# Patient Record
Sex: MALE | Race: WHITE | NOT HISPANIC OR LATINO | Employment: STUDENT | URBAN - METROPOLITAN AREA
[De-identification: names, ages, dates, MRNs, and addresses within clinical notes are randomized per-mention and may not be internally consistent; named-entity substitution may affect disease eponyms.]

---
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
## 2020-01-22 ENCOUNTER — TELEPHONE (OUTPATIENT)
Dept: UROLOGY | Facility: AMBULATORY SURGERY CENTER | Age: 20
End: 2020-01-22

## 2020-01-22 NOTE — TELEPHONE ENCOUNTER
Reason for appointment/Complaint/Diagnosis :testicular pain requesting to see Dr Chen Medina: So Hernández  History of Cancer? no  If yes, what kind?no  Previous urologist? no    Records requested/where?no  Outside testing/where?no  Location Preference for office visit? [facility]

## 2020-01-22 NOTE — TELEPHONE ENCOUNTER
Called and spoke with patient at this time  He reports he has been having left testicular discomfort for over a year now  He reports no injury or trauma precipitated the pain, it just happened out of no where  Patient reports he is from [place] and had an [address] there about a year ago  It showed a small cysts that he was told will resolve on its own  Patient is ok with seeing an AP and wants to see Dr Stanley Ledezma if he needs a procedure  Availability noted for tomorrow afternoon due to cancellations  Appointment made for 1/23/20 at 345pm with Landen GOTTI  Address and directions provided  Patient knows to arrive early and be able to provide urine sample

## 2020-01-23 ENCOUNTER — CONSULT (OUTPATIENT)
Dept: UROLOGY | Facility: CLINIC | Age: 20
End: 2020-01-23
Payer: COMMERCIAL

## 2020-01-23 ENCOUNTER — TELEPHONE (OUTPATIENT)
Dept: UROLOGY | Facility: CLINIC | Age: 20
End: 2020-01-23

## 2020-01-23 VITALS
BODY MASS INDEX: 24.43 KG/M2 | DIASTOLIC BLOOD PRESSURE: 70 MMHG | HEIGHT: 66 IN | HEART RATE: 67 BPM | SYSTOLIC BLOOD PRESSURE: 124 MMHG | WEIGHT: 152 LBS

## 2020-01-23 DIAGNOSIS — N50.819 PERSISTENT TESTICULAR PAIN: Primary | ICD-10-CM

## 2020-01-23 LAB
SL AMB  POCT GLUCOSE, UA: NORMAL
SL AMB LEUKOCYTE ESTERASE,UA: NORMAL
SL AMB POCT BILIRUBIN,UA: NORMAL
SL AMB POCT BLOOD,UA: NORMAL
SL AMB POCT CLARITY,UA: CLEAR
SL AMB POCT COLOR,UA: YELLOW
SL AMB POCT KETONES,UA: NORMAL
SL AMB POCT NITRITE,UA: NORMAL
SL AMB POCT PH,UA: 6.5
SL AMB POCT SPECIFIC GRAVITY,UA: 1.01
SL AMB POCT URINE PROTEIN: NORMAL
SL AMB POCT UROBILINOGEN: 0.2

## 2020-01-23 PROCEDURE — 81002 URINALYSIS NONAUTO W/O SCOPE: CPT | Performed by: PHYSICIAN ASSISTANT

## 2020-01-23 PROCEDURE — 99203 OFFICE O/P NEW LOW 30 MIN: CPT | Performed by: PHYSICIAN ASSISTANT

## 2020-01-23 RX ORDER — EPINEPHRINE 0.3 MG/.3ML
INJECTION SUBCUTANEOUS
COMMUNITY
Start: 2019-05-15

## 2020-01-23 NOTE — TELEPHONE ENCOUNTER
Can you please let the patient know that I have reviewed his case with Dr Jose Brunson and he would like to see him for a discussion and exam?  He is happy to work him in tomorrow, although the patient would need to travel to the [facility]

## 2020-01-23 NOTE — PROGRESS NOTES
Assessment and plan:       1  Testicular pain  - Discomfort has been present for greater than 1 year  - We discussed supportive treatments such as using more supportive undergarments and NSAIDs  He was offered a referral to pelvic floor PT to evaluate for pelvic floor dysfunction, which he declined  - We did discuss potentially a spermatic cord block but he does not feel that his current symptoms justify that at this time  - We will have a repeat US performed in the near future and then will follow up for symptom reassessment and results  Curtis Rueda PA-C      Chief Complaint     Left testicular pain      History of Present Illness     Van Livingston is a 20 y o  male patient establishing care with [facility] for 1 year of scrotal pain  Patient denies any history of trauma  He is from [place] and had an ultrasound performed there approximately 1 year ago  He reports that it showed small cyst that he was told would resolve over time  He reports that sometimes it looks like it is "flipped" and becomes slightly more uncomfortable  She thinks this may also be because he becomes more focused on it  He wears boxers as undergarments  He has not tried antiinflammatories or over the counter pain relievers  He feels that his left testicle is smaller and softer than the right  Urine specimen today is negative for any abnormalities        Laboratory     No results found for: CREATININE    No results found for: PSA    Recent Results (from the past 1 hour(s))   POCT urine dip    Collection Time: 01/23/20  3:32 PM   Result Value Ref Range    LEUKOCYTE ESTERASE,UA -     NITRITE,UA -     SL AMB POCT UROBILINOGEN 0 2     POCT URINE PROTEIN -      PH,UA 6 5     BLOOD,UA -     SPECIFIC GRAVITY,UA 1 015     KETONES,UA -     BILIRUBIN,UA -     GLUCOSE, UA -      COLOR,UA yellow     CLARITY,UA clear          Review of Systems     Review of Systems   Constitutional: Negative for chills and fever  HENT: Negative  Eyes: Negative  Respiratory: Negative for shortness of breath  Cardiovascular: Negative for chest pain  Gastrointestinal: Negative for constipation, diarrhea, nausea and vomiting  Endocrine: Negative  Genitourinary: Positive for testicular pain  Negative for difficulty urinating, dysuria, enuresis, flank pain, frequency, hematuria and urgency  Musculoskeletal: Negative  Neurological: Negative  Hematological: Negative  Psychiatric/Behavioral: Negative  Allergies     Allergies   Allergen Reactions    Food Anaphylaxis     shrimp    Nuts Anaphylaxis    Amoxicillin        Physical Exam     Physical Exam   Constitutional: He is oriented to person, place, and time  He appears well-developed and well-nourished  No distress  HENT:   Head: Normocephalic and atraumatic  Right Ear: External ear normal    Left Ear: External ear normal    Nose: Nose normal    Eyes: Right eye exhibits no discharge  Left eye exhibits no discharge  No scleral icterus  Cardiovascular: Normal rate  Pulmonary/Chest: Effort normal    Genitourinary: Testes normal  Cremasteric reflex is present  Right testis shows no mass, no swelling and no tenderness  Right testis is descended  Cremasteric reflex is not absent on the right side  Left testis shows no mass, no swelling and no tenderness  Left testis is descended  Cremasteric reflex is not absent on the left side  Circumcised  No phimosis, paraphimosis, hypospadias, penile erythema or penile tenderness  No discharge found  Genitourinary Comments: Left testicle is positioned slightly horizontal in the sagittal plane   Neurological: He is alert and oriented to person, place, and time  Skin: Skin is warm and dry  He is not diaphoretic  Psychiatric: He has a normal mood and affect  His behavior is normal  Judgment and thought content normal    Nursing note and vitals reviewed          Vital Signs     Vitals: 01/23/20 1530   BP: 124/70   BP Location: Left arm   Patient Position: Sitting   Cuff Size: Standard   Pulse: 67   Weight: 68 9 kg (152 lb)   Height: 5' 6" (1 676 m)         Current Medications       Current Outpatient Medications:     EPINEPHrine (EPIPEN) 0 3 mg/0 3 mL SOAJ, Inject into a muscle, Disp: , Rfl:     hydrocortisone 2 5 % ointment, Apply topically, Disp: , Rfl:     montelukast (SINGULAIR) 10 mg tablet, Take 1 tablet (10 mg total) by mouth daily at bedtime, Disp: 30 tablet, Rfl: 0      Active Problems     Patient Active Problem List   Diagnosis    Allergic conjunctivitis of both eyes    Other chest pain         Past Medical History     History reviewed  No pertinent past medical history  Surgical History     History reviewed  No pertinent surgical history  Family History     History reviewed  No pertinent family history        Social History     Social History       Radiology

## 2020-01-24 ENCOUNTER — OFFICE VISIT (OUTPATIENT)
Dept: UROLOGY | Facility: CLINIC | Age: 20
End: 2020-01-24
Payer: COMMERCIAL

## 2020-01-24 VITALS
WEIGHT: 151 LBS | HEART RATE: 82 BPM | SYSTOLIC BLOOD PRESSURE: 126 MMHG | BODY MASS INDEX: 24.27 KG/M2 | DIASTOLIC BLOOD PRESSURE: 70 MMHG | HEIGHT: 66 IN

## 2020-01-24 DIAGNOSIS — N44.00 TESTICULAR TORSION: Primary | ICD-10-CM

## 2020-01-24 DIAGNOSIS — N50.819 ORCHIALGIA: ICD-10-CM

## 2020-01-24 PROBLEM — N52.8 OTHER MALE ERECTILE DYSFUNCTION: Status: ACTIVE | Noted: 2020-01-24

## 2020-01-24 PROCEDURE — 99213 OFFICE O/P EST LOW 20 MIN: CPT | Performed by: UROLOGY

## 2020-01-24 NOTE — PROGRESS NOTES
Problem List Items Addressed This Visit        Genitourinary    Testicular torsion - Primary     There is some concern for intermittent testicular torsion in this patient given some horizontal lie of the testis, in addition to his history of testicular discomfort that comes and goes in addition to a reported difference in the vertical height of each testis within the scrotum  I spoke with him about elective bilateral orchiopexy and the pre, shira, and postoperative care for this procedure, he wishes to undergo a scrotal ultrasound prior to making any clinical decisions with regard to surgery  We reviewed ER precautions for testicular torsion, all of his questions and concerns were answered and addressed, he will go for his scrotal ultrasound  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            Other    Orchialgia              Assessment and plan:       Please see problem oriented charting for the assessment plan of today's urological complaints    Eliezer Adams MD      Chief Complaint     Chief Complaint   Patient presents with    Testicle Pain         History of Present Illness     Emerson Alicea is a 20 y o  gentleman recently seen for orchialgia, he describes to me intermittent testicular discomfort and differences in position of his testicles within the scrotum, no aggravating or alleviating factors, no other associated symptoms  Review of systems is significant for significant anxiety regarding his career and life in general, he has a nervous disposition, he tells me  He is quite educated with regard to finance, and is heavily involved in the stock market, and is studying financial engineering in school at [employer]  He occasionally has some erectile dysfunction, he thinks that this is exacerbated by stress, no aggravating leaving factors, no worsening factors, no previous treatments    Counseled extensively today regarding potential causes of erectile dysfunction in young men  No need for or request for medical therapies at this time    The following portions of the patient's history were reviewed and updated as appropriate: allergies, current medications, past family history, past medical history, past social history, past surgical history and problem list     Detailed Urologic History     - please refer to HPI    Review of Systems     Review of Systems   Constitutional: Negative  HENT: Negative  Eyes: Negative  Respiratory: Negative  Cardiovascular: Negative  Gastrointestinal: Negative  Endocrine: Negative  Genitourinary:        As per HPI   Musculoskeletal: Negative  Skin: Negative  Allergic/Immunologic: Negative  Neurological: Negative  Hematological: Negative  Psychiatric/Behavioral: Negative  Allergies     Allergies   Allergen Reactions    Food Anaphylaxis     shrimp    Nuts Anaphylaxis    Amoxicillin        Physical Exam     Physical Exam   Constitutional: He is oriented to person, place, and time  He appears well-developed and well-nourished  No distress  HENT:   Head: Normocephalic and atraumatic  Eyes: Pupils are equal, round, and reactive to light  EOM are normal  Right eye exhibits no discharge  Left eye exhibits no discharge  Neck: No tracheal deviation present  Cardiovascular: Intact distal pulses  Pulmonary/Chest: Effort normal  No stridor  No respiratory distress  Abdominal: Soft  He exhibits no distension and no mass  There is no tenderness  There is no rebound and no guarding  No hernia  Genitourinary:   Genitourinary Comments: Normal Carlos stage, normal meatus, normal penile shaft, testes are normal consistency, the right testis has a abnormal lie today, and is horizontal within the scrotum   Musculoskeletal: He exhibits no edema, tenderness or deformity  Neurological: He is alert and oriented to person, place, and time  No cranial nerve deficit   Coordination normal    Skin: Skin is warm and dry  No rash noted  He is not diaphoretic  No erythema  No pallor  Psychiatric: He has a normal mood and affect  His behavior is normal  Judgment and thought content normal    Nursing note and vitals reviewed  Vital Signs  Vitals:    01/24/20 1257   BP: 126/70   Pulse: 82   Weight: 68 5 kg (151 lb)   Height: 5' 6" (1 676 m)         Current Medications       Current Outpatient Medications:     EPINEPHrine (EPIPEN) 0 3 mg/0 3 mL SOAJ, Inject into a muscle, Disp: , Rfl:     hydrocortisone 2 5 % ointment, Apply topically, Disp: , Rfl:     montelukast (SINGULAIR) 10 mg tablet, Take 1 tablet (10 mg total) by mouth daily at bedtime, Disp: 30 tablet, Rfl: 0      Active Problems     Patient Active Problem List   Diagnosis    Allergic conjunctivitis of both eyes    Other chest pain    Testicular torsion    Mar Mcduffie Other male erectile dysfunction         Past Medical History     History reviewed  No pertinent past medical history  Surgical History     History reviewed  No pertinent surgical history  Family History     History reviewed  No pertinent family history        Social History     Social History     Social History     Tobacco Use   Smoking Status Never Smoker   Smokeless Tobacco Former User         Pertinent Lab Values     No results found for: CREATININE    No results found for: PSA          Pertinent Imaging      Scrotal ultrasound has been ordered

## 2020-01-24 NOTE — PATIENT INSTRUCTIONS
Testicular Torsion   WHAT YOU NEED TO KNOW:   What is testicular torsion? Testicular torsion is a condition in which the spermatic cord that holds the testicle gets twisted  The spermatic cord contains blood vessels and passageways for sperm  When the spermatic cord is twisted, blood flow to the testicle is reduced or blocked  This condition usually happens to only one testicle, but can happen to both  It usually affects babies up to 3 year of age and children 15to 25years of age  What causes testicular torsion? The cause of this condition is not always known  A birth defect may cause it, and symptoms may only appear as you get older  You may have this condition if you play sports, exercise, or have an injury near your groin  Cold weather may also increase your risk  What are the signs and symptoms of testicular torsion? · Severe pain and tenderness in your scrotum    · Red and swollen scrotum    · Testicles that appear to hang a bit higher than usual    · Nausea and vomiting    · Fever  How is testicular torsion diagnosed? Your healthcare provider will do a physical examination  He may ask you questions about your health and your symptoms  You may need the following tests:  · Ultrasound: An ultrasound uses sound waves to show pictures on a monitor  An ultrasound may show problems in your testicles and spermatic cord, including abnormal blood flow  · Scintigraphy: This test uses radioactive dye to check for blood flow in the spermatic cord and scrotum  The dye helps the blood vessels show up better on the x-rays  Tell the healthcare provider if you have ever had an allergic reaction to contrast dye  How is testicular torsion treated? You must see a healthcare provider as soon as possible  Your healthcare provider may try to untwist the spermatic cord by hand  Your healthcare provider may also give you medicine to decrease any pain or swelling   If your healthcare provider cannot untwist it by hand, you may need surgery  Your healthcare provider may have to make an incision on your scrotum to reach and untwist the affected testicle  Your healthcare provider may then attach the affected testicle to the wall of your scrotum to prevent it from twisting again  The unaffected testicle may also be attached to the scrotum to prevent testicular torsion  What are the risks of testicular torsion? If you have surgery, you may bleed more than expected or get an infection  Even after treatment, your testicle may get smaller or have decreased sperm and hormone production  With or without treatment, the lack of blood flow to the testicle may lead to an infection  The testicle may shrink  The testicle may die and need to be removed completely  This may make it difficult for you to get a woman pregnant  If both testicles need to be removed, you will be sterile (unable to get a woman pregnant)  When should I contact my healthcare provider? · You have a fever  · Your skin is itchy, swollen, or has a rash  · You have questions or concerns about your condition or care  When should I seek immediate care? · You have increased pain, swelling, or redness in your scrotum  CARE AGREEMENT:   You have the right to help plan your care  Learn about your health condition and how it may be treated  Discuss treatment options with your caregivers to decide what care you want to receive  You always have the right to refuse treatment  The above information is an  only  It is not intended as medical advice for individual conditions or treatments  Talk to your doctor, nurse or pharmacist before following any medical regimen to see if it is safe and effective for you  © 2017 [employer]  Information is for End User's use only and may not be sold, redistributed or otherwise used for commercial purposes   All illustrations and images included in CareNotes® are the copyrighted property of [employer]  or Chace Bishop

## 2020-01-24 NOTE — ASSESSMENT & PLAN NOTE
Patient describes himself as a nervous person, occasional difficulty getting and maintaining erections, I counseled him on the largely psychological component of most erectile dysfunction in young men    He appreciated this counseling, should this worsen or become more of an issue for him, we can try phosphodiesterase 5 inhibition in the future as needed

## 2020-01-24 NOTE — ASSESSMENT & PLAN NOTE
There is some concern for intermittent testicular torsion in this patient given some horizontal lie of the testis, in addition to his history of testicular discomfort that comes and goes in addition to a reported difference in the vertical height of each testis within the scrotum  I spoke with him about elective bilateral orchiopexy and the pre, shira, and postoperative care for this procedure, he wishes to undergo a scrotal ultrasound prior to making any clinical decisions with regard to surgery  We reviewed ER precautions for testicular torsion, all of his questions and concerns were answered and addressed, he will go for his scrotal ultrasound    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

## 2020-01-26 ENCOUNTER — HOSPITAL ENCOUNTER (OUTPATIENT)
Dept: ULTRASOUND IMAGING | Facility: HOSPITAL | Age: 20
Discharge: HOME | End: 2020-01-26
Payer: COMMERCIAL

## 2020-01-26 DIAGNOSIS — N50.819 PERSISTENT TESTICULAR PAIN: ICD-10-CM

## 2020-01-26 PROCEDURE — 76870 US EXAM SCROTUM: CPT

## 2020-01-28 NOTE — PROGRESS NOTES
Assessment and plan:       1  Testicular pain  - Ultrasound was positive for bilateral vascularity to testicles  - Patient was reassured that at this time his ultrasound is not concerning   - He was again encouraged to consider orchiopexy due to concern for intermittent testicular torsion   - He reports that he is not interested in this treatment at this time but will continue to consider it  - He was encouraged to contact our office or proceed to the emergency department immediately if he has another onset of symptoms  Armando Del Valle PA-C      Chief Complaint     Testicular pain      History of Present Illness     Antoinette Romo is a 20 y o  male patient recently seen in consultation for intermittent left testicular discomfort  After reviewing his note, Dr Larisa Cruz was concerned about potential intermittent testicular torsion and saw the patient the following day for examination and elective bilateral orchiopexy  Patient preferred to undergo follow-up ultrasound prior to making a decision  Ultrasound was performed on 01/26/2020  This is not concerning for any acute findings  It did identify blood flow to bilateral testicles  Incidentally noted were small bilateral hydroceles  Patient has not had another onset of testicular pain since his last follow-up  Laboratory     No results found for: CREATININE    No results found for: PSA    No results found for this or any previous visit (from the past 1 hour(s))  Review of Systems     Review of Systems   Constitutional: Negative for chills and fever  HENT: Negative  Eyes: Negative  Respiratory: Negative for shortness of breath  Cardiovascular: Negative for chest pain  Gastrointestinal: Negative for constipation, diarrhea, nausea and vomiting     Genitourinary: Negative for decreased urine volume, difficulty urinating, dysuria, enuresis, flank pain, frequency, genital sores, hematuria, penile pain, scrotal swelling and testicular pain  Musculoskeletal: Negative  Allergies     Allergies   Allergen Reactions    Food Anaphylaxis     shrimp    Nuts Anaphylaxis    Amoxicillin        Physical Exam     Physical Exam   Constitutional: He is oriented to person, place, and time  He appears well-developed and well-nourished  No distress  HENT:   Head: Normocephalic and atraumatic  Right Ear: External ear normal    Left Ear: External ear normal    Nose: Nose normal    Eyes: Right eye exhibits no discharge  Left eye exhibits no discharge  No scleral icterus  Cardiovascular: Normal rate  Pulmonary/Chest: Effort normal    Musculoskeletal:   Ambulates independently   Neurological: He is alert and oriented to person, place, and time  Skin: Skin is warm and dry  He is not diaphoretic  Psychiatric: He has a normal mood and affect  His behavior is normal  Judgment and thought content normal    Nursing note and vitals reviewed  Vital Signs     Vitals:    01/30/20 1358   BP: 122/82   BP Location: Left arm   Patient Position: Sitting   Cuff Size: Standard   Pulse: 66   Weight: 68 kg (150 lb)   Height: 5' 6" (1 676 m)         Current Medications       Current Outpatient Medications:     EPINEPHrine (EPIPEN) 0 3 mg/0 3 mL SOAJ, Inject into a muscle, Disp: , Rfl:     hydrocortisone 2 5 % ointment, Apply topically, Disp: , Rfl:     montelukast (SINGULAIR) 10 mg tablet, Take 1 tablet (10 mg total) by mouth daily at bedtime, Disp: 30 tablet, Rfl: 0      Active Problems     Patient Active Problem List   Diagnosis    Allergic conjunctivitis of both eyes    Other chest pain    Testicular torsion    Jasson Francesco Other male erectile dysfunction         Past Medical History     History reviewed  No pertinent past medical history  Surgical History     History reviewed  No pertinent surgical history  Family History     History reviewed  No pertinent family history        Social History     Social History Radiology     SCROTAL ULTRASOUND     INDICATION:    N50 9: Disorder of male genital organs, unspecified      COMPARISON: None     TECHNIQUE:   Ultrasound the scrotal contents was performed with a high frequency linear transducer utilizing volumetric sweep imaging as well as standard still image techniques  Imaging performed in longitudinal and transverse orientation  Color and   spectral Doppler evaluation also performed bilaterally      FINDINGS:     TESTES:   Testes are symmetric and normal in size      RIGHT testis = 5 2 x 2 1 x 3 1 cm   Normal contour with homogeneous smooth echotexture  No intratesticular mass lesion or calcifications      LEFT testis = 4 5 x 2 1 x 3 cm  Normal contour with homogeneous smooth echotexture  No intratesticular mass lesion or calcifications      Doppler flow within both testes is present and symmetric      EPIDIDYMIDES:   Normal Size  Doppler ultrasound demonstrates normal blood flow  There are small epididymal cyst(s) in the left epididymis  Otherwise unremarkable      HYDROCELE:  Trace bilateral hydroceles could be physiologic      VARICOCELE:  None present      SCROTUM:  Scrotal thickness and appearance within normal limits  No evidence for extratesticular mass or hernia demonstrated      IMPRESSION:     Trace bilateral hydroceles could be physiologic

## 2020-01-30 ENCOUNTER — OFFICE VISIT (OUTPATIENT)
Dept: UROLOGY | Facility: CLINIC | Age: 20
End: 2020-01-30
Payer: COMMERCIAL

## 2020-01-30 VITALS
BODY MASS INDEX: 24.11 KG/M2 | HEART RATE: 66 BPM | HEIGHT: 66 IN | WEIGHT: 150 LBS | SYSTOLIC BLOOD PRESSURE: 122 MMHG | DIASTOLIC BLOOD PRESSURE: 82 MMHG

## 2020-01-30 DIAGNOSIS — N44.00 TESTICULAR TORSION: Primary | ICD-10-CM

## 2020-01-30 PROCEDURE — 99213 OFFICE O/P EST LOW 20 MIN: CPT | Performed by: PHYSICIAN ASSISTANT

## 2022-03-16 ENCOUNTER — APPOINTMENT (RX ONLY)
Dept: URBAN - METROPOLITAN AREA CLINIC 153 | Facility: CLINIC | Age: 22
Setting detail: DERMATOLOGY
End: 2022-03-16

## 2022-03-16 DIAGNOSIS — L30.9 DERMATITIS, UNSPECIFIED: ICD-10-CM | Status: INADEQUATELY CONTROLLED

## 2022-03-16 PROCEDURE — 99203 OFFICE O/P NEW LOW 30 MIN: CPT

## 2022-03-16 PROCEDURE — ? TREATMENT REGIMEN

## 2022-03-16 PROCEDURE — ? COUNSELING

## 2022-03-16 ASSESSMENT — LOCATION DETAILED DESCRIPTION DERM
LOCATION DETAILED: LEFT RADIAL DORSAL HAND
LOCATION DETAILED: RIGHT RADIAL DORSAL HAND

## 2022-03-16 ASSESSMENT — LOCATION SIMPLE DESCRIPTION DERM
LOCATION SIMPLE: RIGHT HAND
LOCATION SIMPLE: LEFT HAND

## 2022-03-16 ASSESSMENT — LOCATION ZONE DERM: LOCATION ZONE: HAND

## 2022-03-16 NOTE — PROCEDURE: TREATMENT REGIMEN
Detail Level: Simple
Plan: [place] a [place] evaluation and management.\\nThere’s no active lesion in todays visit for us to perform a biopsy and the patient is leaving [place] go [place] in 4 [place].

## 2022-03-16 NOTE — HPI: SKIN LESION
How Severe Is Your Skin Lesion?: mild
Has Your Skin Lesion Been Treated?: been treated
Is This A New Presentation, Or A Follow-Up?: Skin Lesions
Additional History: Patient stated the only new change was a recent trip to  [place] in January in a ski trip a little after that he began this rash which he stated started on his face some on his nostrils that cleared up in 2 weeks,then began 2 months ago on his hands.Patient stated his previous dermatologist will consider a biopsy if worsens, when patient returns to [place].

## 2025-01-22 NOTE — TELEPHONE ENCOUNTER
This is an urgent care patient Pt denies any current N/V; ordered for zofran. Reports ongoing constipation with last BM x 5 days ago. Bowel regimen: milk of magnesia, miralax, senna.

## 2025-03-24 NOTE — PATIENT INSTRUCTIONS
Chest Pain   WHAT YOU NEED TO KNOW:   Chest pain can be caused by a range of conditions, from not serious to life-threatening  Chest pain can be a symptom of a digestive problem, such as acid reflux or a stomach ulcer  An anxiety attack or a strong emotion, such as anger, can also cause chest pain  Infection, inflammation, or a fracture in the bones or cartilage in your chest can cause pain or discomfort  Sometimes chest pain or pressure is caused by poor blood flow to your heart (angina)  Chest pain may also be caused by life-threatening conditions such as a heart attack or blood clot in your lungs  DISCHARGE INSTRUCTIONS:   Call 911 if:   · You have any of the following signs of a heart attack:      ¨ Squeezing, pressure, or pain in your chest that lasts longer than 5 minutes or returns    ¨ Discomfort or pain in your back, neck, jaw, stomach, or arm     ¨ Trouble breathing    ¨ Nausea or vomiting    ¨ Lightheadedness or a sudden cold sweat, especially with chest pain or trouble breathing    Return to the emergency department if:   · You have chest discomfort that gets worse, even with medicine  · You cough or vomit blood  · Your bowel movements are black or bloody  · You cannot stop vomiting, or it hurts to swallow  Contact your healthcare provider if:   · You have questions or concerns about your condition or care  Medicines:   · Medicines  may be given to treat the cause of your chest pain  Examples include pain medicine, anxiety medicine, or medicines to increase blood flow to your heart  · Do not take certain medicines without asking your healthcare provider first   These include NSAIDs, herbal or vitamin supplements, or hormones (estrogen or progestin)  · Take your medicine as directed  Contact your healthcare provider if you think your medicine is not helping or if you have side effects  Tell him or her if you are allergic to any medicine   Keep a list of the medicines, vitamins, Patient called to request a refill of celecoxib (CELEBREX) 200 MG capsule be sent to [facility]/pharmacy #[id number] - [place], [place] - [address] - [place] [phone] - f [phone].   and herbs you take  Include the amounts, and when and why you take them  Bring the list or the pill bottles to follow-up visits  Carry your medicine list with you in case of an emergency  Follow up with your healthcare provider within 72 hours, or as directed: You may need to return for more tests to find the cause of your chest pain  You may be referred to a specialist, such as a cardiologist or gastroenterologist  Write down your questions so you remember to ask them during your visits  Healthy living tips: The following are general healthy guidelines  If your chest pain is caused by a heart problem, your healthcare provider will give you specific guidelines to follow  · Do not smoke  Nicotine and other chemicals in cigarettes and cigars can cause lung and heart damage  Ask your healthcare provider for information if you currently smoke and need help to quit  E-cigarettes or smokeless tobacco still contain nicotine  Talk to your healthcare provider before you use these products  · Eat a variety of healthy, low-fat foods  Healthy foods include fruits, vegetables, whole-grain breads, low-fat dairy products, beans, lean meats, and fish  Ask for more information about a heart healthy diet  · Ask about activity  Your healthcare provider will tell you which activities to limit or avoid  Ask when you can drive, return to work, and have sex  Ask about the best exercise plan for you  · Maintain a healthy weight  Ask your healthcare provider how much you should weigh  Ask him or her to help you create a weight loss plan if you are overweight  · Get the flu and pneumonia vaccines  All adults should get the influenza (flu) vaccine  Get it every year as soon as it becomes available  The pneumococcal vaccine is given to adults aged 72 years or older  The vaccine is given every 5 years to prevent pneumococcal disease, such as pneumonia    © 2017 [employer] Information is for End User's use only and may not be sold, redistributed or otherwise used for commercial purposes  All illustrations and images included in CareNotes® are the copyrighted property of [employer]  or Chace Bishop  The above information is an  only  It is not intended as medical advice for individual conditions or treatments  Talk to your doctor, nurse or pharmacist before following any medical regimen to see if it is safe and effective for you  Impaired gait/Other medical problems/Weakness